# Patient Record
Sex: MALE | Race: WHITE
[De-identification: names, ages, dates, MRNs, and addresses within clinical notes are randomized per-mention and may not be internally consistent; named-entity substitution may affect disease eponyms.]

---

## 2020-04-04 ENCOUNTER — TRANSCRIPTION ENCOUNTER (OUTPATIENT)
Age: 55
End: 2020-04-04

## 2021-10-06 ENCOUNTER — RESULT CHARGE (OUTPATIENT)
Age: 56
End: 2021-10-06

## 2021-10-06 ENCOUNTER — NON-APPOINTMENT (OUTPATIENT)
Age: 56
End: 2021-10-06

## 2021-10-06 ENCOUNTER — APPOINTMENT (OUTPATIENT)
Dept: FAMILY MEDICINE | Facility: CLINIC | Age: 56
End: 2021-10-06
Payer: COMMERCIAL

## 2021-10-06 VITALS — DIASTOLIC BLOOD PRESSURE: 78 MMHG | SYSTOLIC BLOOD PRESSURE: 125 MMHG

## 2021-10-06 VITALS
HEIGHT: 69 IN | HEART RATE: 82 BPM | TEMPERATURE: 97.1 F | RESPIRATION RATE: 14 BRPM | OXYGEN SATURATION: 97 % | WEIGHT: 186.6 LBS | BODY MASS INDEX: 27.64 KG/M2

## 2021-10-06 PROCEDURE — 36415 COLL VENOUS BLD VENIPUNCTURE: CPT

## 2021-10-06 PROCEDURE — 99214 OFFICE O/P EST MOD 30 MIN: CPT | Mod: 25

## 2021-10-06 NOTE — ASSESSMENT
[FreeTextEntry1] : This is a note dictation for this 56-year-old male who came in the patient had called about a year ago and since that time just doesn't feel right he doesn't think he has the stamina he had before that time even though he does work many hours he is a  and is on the very long shifts he has no chest pain he has no temperature but he does you know work hard. His pressure was 125/78 his heart had a regular rhythm S1-S2 lungs are clear and the patient the nose very alert and comfortable or we will do today is going to do a comprehensive lab studies on him as well seen him for a chest x-ray results will go with the patient when everything is return here to the office his urine was negative he's not on any medications at the present time

## 2021-10-07 LAB
ALBUMIN SERPL ELPH-MCNC: 5.1 G/DL
ALP BLD-CCNC: 89 U/L
ALT SERPL-CCNC: 38 U/L
ANION GAP SERPL CALC-SCNC: 14 MMOL/L
AST SERPL-CCNC: 30 U/L
BASOPHILS # BLD AUTO: 0.04 K/UL
BASOPHILS NFR BLD AUTO: 0.6 %
BILIRUB SERPL-MCNC: 0.6 MG/DL
BUN SERPL-MCNC: 19 MG/DL
CALCIUM SERPL-MCNC: 9.7 MG/DL
CHLORIDE SERPL-SCNC: 104 MMOL/L
CHOLEST SERPL-MCNC: 280 MG/DL
CO2 SERPL-SCNC: 22 MMOL/L
CREAT SERPL-MCNC: 1.02 MG/DL
EOSINOPHIL # BLD AUTO: 0.16 K/UL
EOSINOPHIL NFR BLD AUTO: 2.5 %
GLUCOSE SERPL-MCNC: 93 MG/DL
HCT VFR BLD CALC: 49.5 %
HDLC SERPL-MCNC: 69 MG/DL
HGB BLD-MCNC: 16.7 G/DL
IMM GRANULOCYTES NFR BLD AUTO: 0.5 %
LDLC SERPL CALC-MCNC: 185 MG/DL
LYMPHOCYTES # BLD AUTO: 1.49 K/UL
LYMPHOCYTES NFR BLD AUTO: 23.5 %
MAN DIFF?: NORMAL
MCHC RBC-ENTMCNC: 33.7 GM/DL
MCHC RBC-ENTMCNC: 35.4 PG
MCV RBC AUTO: 104.9 FL
MONOCYTES # BLD AUTO: 0.63 K/UL
MONOCYTES NFR BLD AUTO: 9.9 %
NEUTROPHILS # BLD AUTO: 4 K/UL
NEUTROPHILS NFR BLD AUTO: 63 %
NONHDLC SERPL-MCNC: 211 MG/DL
PLATELET # BLD AUTO: 204 K/UL
POTASSIUM SERPL-SCNC: 4.9 MMOL/L
PROT SERPL-MCNC: 7.5 G/DL
PSA FREE FLD-MCNC: 25 %
PSA FREE SERPL-MCNC: 0.34 NG/ML
PSA SERPL-MCNC: 1.36 NG/ML
RBC # BLD: 4.72 M/UL
RBC # FLD: 12 %
SODIUM SERPL-SCNC: 140 MMOL/L
T4 FREE SERPL-MCNC: 1.1 NG/DL
TRIGL SERPL-MCNC: 130 MG/DL
TSH SERPL-ACNC: 1.17 UIU/ML
WBC # FLD AUTO: 6.35 K/UL

## 2021-10-08 LAB
TESTOST BND SERPL-MCNC: 12.9 PG/ML
TESTOSTERONE TOTAL S: 594 NG/DL

## 2021-11-05 ENCOUNTER — APPOINTMENT (OUTPATIENT)
Dept: FAMILY MEDICINE | Facility: CLINIC | Age: 56
End: 2021-11-05
Payer: COMMERCIAL

## 2021-11-05 VITALS — RESPIRATION RATE: 14 BRPM | TEMPERATURE: 97.3 F | OXYGEN SATURATION: 98 % | HEART RATE: 78 BPM

## 2021-11-05 PROCEDURE — 99211 OFF/OP EST MAY X REQ PHY/QHP: CPT

## 2021-11-05 NOTE — ASSESSMENT
[FreeTextEntry1] : This is a dictation on this 56-year-old male came in today just to go over the results of his laboratory studies came in proximally due to the difficulty of getting in touch with the office by telephone review all the results with the patient and explained to him any corrections that would have to be done other than his diet and he requested a prescription for and out of 3 mg and she received today he returned to the office in 3 months to have a repeat lipid profile

## 2021-12-13 ENCOUNTER — APPOINTMENT (OUTPATIENT)
Dept: FAMILY MEDICINE | Facility: CLINIC | Age: 56
End: 2021-12-13
Payer: OTHER MISCELLANEOUS

## 2021-12-13 VITALS
OXYGEN SATURATION: 98 % | BODY MASS INDEX: 27.5 KG/M2 | HEART RATE: 78 BPM | TEMPERATURE: 96.7 F | WEIGHT: 186.2 LBS | RESPIRATION RATE: 14 BRPM

## 2021-12-13 DIAGNOSIS — K46.9 UNSPECIFIED ABDOMINAL HERNIA W/OUT OBSTRUCTION OR GANGRENE: ICD-10-CM

## 2021-12-13 PROCEDURE — 99072 ADDL SUPL MATRL&STAF TM PHE: CPT

## 2021-12-13 PROCEDURE — 99214 OFFICE O/P EST MOD 30 MIN: CPT

## 2021-12-13 NOTE — ASSESSMENT
[FreeTextEntry1] : This Is a note of dictation on a  56-year-old male who works as a .  While at work he was lifting and dragging water urns and felt a strain in his abdomen and then noticed a bulge by his umbilicus.  There is a  lump which is going in and out of on physical examination the patient has a umbilical hernia which is easily reducible however he is concerned about it and referring him to a general surgeon for repair my impression is umbilical hernia.

## 2022-03-28 ENCOUNTER — APPOINTMENT (OUTPATIENT)
Dept: FAMILY MEDICINE | Facility: CLINIC | Age: 57
End: 2022-03-28
Payer: COMMERCIAL

## 2022-03-28 VITALS
OXYGEN SATURATION: 99 % | TEMPERATURE: 97.8 F | WEIGHT: 189 LBS | HEART RATE: 87 BPM | BODY MASS INDEX: 27.99 KG/M2 | HEIGHT: 69 IN | RESPIRATION RATE: 14 BRPM

## 2022-03-28 PROCEDURE — 99213 OFFICE O/P EST LOW 20 MIN: CPT

## 2022-03-28 NOTE — ASSESSMENT
[FreeTextEntry1] : Is a note of dictation this 56-year-old male who came in for refill of his we checked his pressures pressure was 135/88 his heart had a regular rhythm S1-S2 lungs are clear and a prescription for his to the Panafil was refilled he returned to the office when he needs additional refills

## 2023-02-27 ENCOUNTER — NON-APPOINTMENT (OUTPATIENT)
Age: 58
End: 2023-02-27

## 2023-03-09 ENCOUNTER — APPOINTMENT (OUTPATIENT)
Dept: FAMILY MEDICINE | Facility: CLINIC | Age: 58
End: 2023-03-09
Payer: COMMERCIAL

## 2023-03-09 VITALS
OXYGEN SATURATION: 97 % | HEIGHT: 69 IN | TEMPERATURE: 98.1 F | WEIGHT: 192 LBS | HEART RATE: 95 BPM | BODY MASS INDEX: 28.44 KG/M2 | RESPIRATION RATE: 14 BRPM

## 2023-03-09 VITALS — DIASTOLIC BLOOD PRESSURE: 88 MMHG | SYSTOLIC BLOOD PRESSURE: 131 MMHG

## 2023-03-09 DIAGNOSIS — R05.9 COUGH, UNSPECIFIED: ICD-10-CM

## 2023-03-09 DIAGNOSIS — F17.210 NICOTINE DEPENDENCE, CIGARETTES, UNCOMPLICATED: ICD-10-CM

## 2023-03-09 PROCEDURE — G0296 VISIT TO DETERM LDCT ELIG: CPT

## 2023-03-09 PROCEDURE — 99213 OFFICE O/P EST LOW 20 MIN: CPT | Mod: 25

## 2023-03-09 NOTE — HEALTH RISK ASSESSMENT
[Former] : Former [20 or more] : 20 or more [< 15 Years] : < 15 Years [de-identified] : Quit last year

## 2023-03-09 NOTE — REVIEW OF SYSTEMS
[Fever] : no fever [Chills] : no chills [Shortness Of Breath] : shortness of breath [Wheezing] : wheezing [Cough] : cough [Negative] : Cardiovascular

## 2023-03-09 NOTE — ASSESSMENT
[FreeTextEntry1] : Cough-has been persistent for the last 4 weeks.  Will extend steroid treatment with Medrol Dosepak and start albuterol inhaler which she can use scheduled for the next few days.  May be secondary to postviral cough syndrome, however may have underlying COPD given history of smoking.  We will also order a chest x-ray and a lung cancer screening CT.  Also advised him to try honey and warm water and self proning.  If no improvement, may need maintenance inhaler and or referral to pulmonology.  Return/hospital precautions given.  He will return in 2 weeks for reevaluation

## 2023-03-09 NOTE — PHYSICAL EXAM
[No Accessory Muscle Use] : no accessory muscle use [Normal] : normal rate, regular rhythm, normal S1 and S2 and no murmur heard [de-identified] : Very tight, mild expiratory wheezes

## 2023-03-09 NOTE — HISTORY OF PRESENT ILLNESS
[___ Weeks ago] :  [unfilled] weeks ago [Congestion] : congestion [Cough] : cough [Sore Throat] : sore throat [Wheezing] : wheezing [Shortness Of Breath] : shortness of breath [Fatigue] : fatigue [Headache] : headache [Fever] : no fever [FreeTextEntry1] : pt. went to urgent care twice [FreeTextEntry8] : Ace presents today for evaluation of cough.  He states that symptom onset was 4 weeks ago.  It started as a dry cough then developed into a nasal cold and in the last 2.5 weeks he has felt short of breath in his upper chest.  Cough has been alternating between productive and now semiproductive.  He was seen at urgent care twice and had a negative COVID test 4 weeks ago.  Treatment has included Tessalon Perles, nasal spray that were both ineffective.  [facility] and was given a prednisone burst for 5 days which helped slightly but after he completed the course yesterday, his symptoms worsened again.  He has history of smoking for over 40 years and quit last year.  The cough keeps him up at night and he has difficulty finding a position that prevents coughing fits\par \par

## 2023-03-13 ENCOUNTER — NON-APPOINTMENT (OUTPATIENT)
Age: 58
End: 2023-03-13

## 2023-06-20 ENCOUNTER — NON-APPOINTMENT (OUTPATIENT)
Age: 58
End: 2023-06-20

## 2024-05-16 ENCOUNTER — APPOINTMENT (OUTPATIENT)
Dept: INTERNAL MEDICINE | Facility: CLINIC | Age: 59
End: 2024-05-16
Payer: COMMERCIAL

## 2024-05-16 VITALS
DIASTOLIC BLOOD PRESSURE: 85 MMHG | SYSTOLIC BLOOD PRESSURE: 150 MMHG | BODY MASS INDEX: 28.14 KG/M2 | WEIGHT: 190 LBS | OXYGEN SATURATION: 97 % | HEIGHT: 69 IN | HEART RATE: 90 BPM

## 2024-05-16 DIAGNOSIS — Z00.00 ENCOUNTER FOR GENERAL ADULT MEDICAL EXAMINATION W/OUT ABNORMAL FINDINGS: ICD-10-CM

## 2024-05-16 DIAGNOSIS — F52.21 MALE ERECTILE DISORDER: ICD-10-CM

## 2024-05-16 DIAGNOSIS — F17.211 NICOTINE DEPENDENCE, CIGARETTES, IN REMISSION: ICD-10-CM

## 2024-05-16 PROCEDURE — 99396 PREV VISIT EST AGE 40-64: CPT

## 2024-05-16 PROCEDURE — G0296 VISIT TO DETERM LDCT ELIG: CPT | Mod: 59

## 2024-05-16 RX ORDER — ALBUTEROL SULFATE 90 UG/1
108 (90 BASE) INHALANT RESPIRATORY (INHALATION)
Qty: 1 | Refills: 1 | Status: DISCONTINUED | COMMUNITY
Start: 2023-03-09 | End: 2024-05-16

## 2024-05-16 RX ORDER — METHYLPREDNISOLONE 4 MG/1
4 TABLET ORAL
Qty: 1 | Refills: 0 | Status: DISCONTINUED | COMMUNITY
Start: 2023-03-09 | End: 2024-05-16

## 2024-05-16 RX ORDER — TADALAFIL 20 MG/1
20 TABLET ORAL
Qty: 90 | Refills: 0 | Status: ACTIVE | COMMUNITY
Start: 2021-11-05 | End: 1900-01-01

## 2024-05-16 NOTE — HEALTH RISK ASSESSMENT
[Former] : Former [20 or more] : 20 or more [< 15 Years] : < 15 Years [0] : 2) Feeling down, depressed, or hopeless: Not at all (0) [PHQ-2 Negative - No further assessment needed] : PHQ-2 Negative - No further assessment needed [FUD2Miosv] : 0 [de-identified] : quit 4 years ago, 1 pack a year for atleast 20 years, total of 40 years [Employed] : employed [] :

## 2024-05-16 NOTE — HISTORY OF PRESENT ILLNESS
[FreeTextEntry1] : cpe [de-identified] : 59 y/o male with no significant pmhx presents for cpe. Patient recently had lab work tte and carotid doppler with his work.  For HLD, most recent lipid panel 4/2024 with elevated ldl of 179.   Patients lab work with normal psa, hgba1c of 5.3, and stable cmp and cbc Patient with tte 4/24 minimal calcification of aortic valve, no stenosis, trace pulmonic insufficiency, LVEF 60% minimal calcification of mitral annulus Carotid doppler with minimal calcified plaque within right common and left internal carotid, no significant stenosis

## 2024-05-16 NOTE — ASSESSMENT
[FreeTextEntry1] : CPE: -most recent labs reviewed - no need to repeat -Colon Cancer screening: due, will be going for colonoscopy -Lung Cancer Screening: will screen with annual low-dose computed tomography -Vaccinations:Td: up to date as per patient -patient expressed understanding of plan, all questions answered  HLD - recommend dietary and lifestyle modifications at this time follow up in 6 months for repeat lipid panel  ED - will renew tadalafil to be taken as needed